# Patient Record
Sex: FEMALE | Employment: OTHER | URBAN - METROPOLITAN AREA
[De-identification: names, ages, dates, MRNs, and addresses within clinical notes are randomized per-mention and may not be internally consistent; named-entity substitution may affect disease eponyms.]

---

## 2018-08-30 ENCOUNTER — HOSPITAL ENCOUNTER (OUTPATIENT)
Dept: PHYSICAL THERAPY | Age: 78
Discharge: HOME | End: 2018-08-30
Payer: MEDICARE

## 2018-08-30 PROCEDURE — 97110 THERAPEUTIC EXERCISES: CPT | Performed by: PHYSICAL THERAPIST

## 2018-08-30 PROCEDURE — G8982 BODY POS GOAL STATUS: HCPCS | Performed by: PHYSICAL THERAPIST

## 2018-08-30 PROCEDURE — 97162 PT EVAL MOD COMPLEX 30 MIN: CPT | Performed by: PHYSICAL THERAPIST

## 2018-08-30 PROCEDURE — G8981 BODY POS CURRENT STATUS: HCPCS | Performed by: PHYSICAL THERAPIST

## 2018-08-30 NOTE — PROGRESS NOTES
PHYSICAL THERAPY - DAILY TREATMENT NOTE Patient Name: Tawanda Self        Date: 2018 : 1940   YES Patient  Verified Visit #:     Insurance: Payor: Ida Cary / Plan: Shalonda Alvarezazeem / Product Type: Medicare / In time: 10:05 Out time: 10:55 Total Treatment Time: 50 Medicare Time Tracking (below) Total Timed Codes (min):  10 1:1 Treatment Time:  10 TREATMENT AREA = Lumbar and sacral arthritis [M48.9] SUBJECTIVE Pain Level (on 0 to 10 scale):  3  / 10 Medication Changes/New allergies or changes in medical history, any new surgeries or procedures? NO    If yes, update Summary List  
Subjective Functional Status/Changes:  []  No changes reported See eval/POC OBJECTIVE Modalities Rationale:     decrease pain and increase tissue extensibility to improve patient's ability to prevent soreness 
 min [] Estim, type/location:    
                                 []  att     []  unatt     []  w/US     []  w/ice    []  w/heat  
 min []  Mechanical Traction: type/lbs   
               []  pro   []  sup   []  int   []  cont    []  before manual    []  after manual  
 min []  Ultrasound, settings/location:    
 min []  Iontophoresis w/ dexamethasone, location:   
                                           []  take home patch       []  in clinic  
10 min []  Ice     [x]  Heat    location/position: Low Back - supine after treatment  
 min []  Vasopneumatic Device, press/temp:   
 min []  Other:   
[] Skin assessment post-treatment (if applicable):   
[]  intact    []  redness- no adverse reaction    
[]redness  adverse reaction:     
 
10 min Therapeutic Exercise:  [x]  See flow sheet Rationale:      increase ROM and improve coordination to improve the patients ability to relieve LBP  
 
 
 min Therapeutic Activity:   
 
 
 min Neuromuscular Re-ed:   
 
 
 min Gait Training:   
 
 min Patient Education:  YES  Reviewed HEP  
 []  Progressed/Changed HEP based on: Other Objective/Functional Measures: FOTO - 40 Post Treatment Pain Level (on 0 to 10) scale:   2  / 10 ASSESSMENT Assessment/Changes in Function:  
 
Signs and symptoms suggest lateral foraminal stenosis symptoms. []  See Progress Note/Recertification Patient will continue to benefit from skilled PT services to modify and progress therapeutic interventions, address functional mobility deficits, address ROM deficits, address strength deficits, analyze and address soft tissue restrictions, analyze and cue movement patterns, analyze and modify body mechanics/ergonomics and assess and modify postural abnormalities to attain remaining goals. Progress toward goals / Updated goals: 
 
Goals established today PLAN [x]  Upgrade activities as tolerated YES Continue plan of care  
[]  Discharge due to :   
[]  Other:   
 
Therapist: Jessica Clement PT Date: 8/30/2018 Time: 9:50 AM  
 
Future Appointments Date Time Provider John Schroeder 8/30/2018 10:00 AM Jessica Clement, PT [facility]

## 2018-08-30 NOTE — PROGRESS NOTES
[address] [facility] [place] [facility]  [facility] 
Lucius Webster \[facility] [place] [facility]\""[address], [address] ,[address], [address]  Phone: [phone]  Fax: [phone] PLAN OF CARE / STATEMENT OF MEDICAL NECESSITY FOR PHYSICAL THERAPY SERVICES Patient Name: Tad Harris : 1940 Medical  
Diagnosis: Lumbar and sacral arthritis [M48.9] Treatment Diagnosis: LBP Onset Date: 2018 Referral Source: Frances Franz MD Start of Care [facility]): 2018 Prior Hospitalization: See medical history Provider #: [id number] Prior Level of Function: Able to walk in community without L LE pain - 6 months ago. Comorbidities: DM, HTN, OA L HIp, kidney disease Medications: Verified on Patient Summary List  
The Plan of Care and following information is based on the information from the initial evaluation.  
=========================================================================================== Assessment / key information:  69 y/o female presents to PT with c/o lower back and left leg pain since about 4 months ago. Her symptoms were 10/10 at one point and she could not tolerate standing or walking. Symptoms are improved with lying down. Currently her symtpoms have stayed around 4/10 with  New medications: gabapentin and tramadol. Today she has pain with walking, but cooley snot have a noticeable limp. Spinal AROM is limited by pain into flexion (25%), extension to 10%, and sidebend left causes pain immediately. L LE strength was limited in hip flexion (3/5 with pain), knee extension (4/5), and DF (3+/5). Pt is tender to palpate centrally at lower lumbar spine. Pt's L Le pain was alleviate with sidelying positional distraction. Pt has signs and symptoms suggestive of left lumbar foraminal stenosis.  
=========================================================================================== Eval Complexity: History MEDIUM  Complexity : 1-2 comorbidities / personal factors will impact the outcome/ POC ;  Examination  MEDIUM Complexity : 3 Standardized tests and measures addressing body structure, function, activity limitation and / or participation in recreation ; Presentation MEDIUM Complexity : Evolving with changing characteristics ; Decision Making MEDIUM Complexity : FOTO score of 26-74; Overall Complexity MEDIUM Problem List: pain affecting function, decrease ROM, decrease strength, impaired gait/ balance, decrease ADL/ functional abilitiies, decrease activity tolerance, decrease flexibility/ joint mobility and decrease transfer abilities Treatment Plan may include any combination of the following: Therapeutic exercise, Therapeutic activities, Neuromuscular re-education, Physical agent/modality, Gait/balance training, Manual therapy, Dry Needling, Aquatic therapy, Patient education, Self Care training, Functional mobility training, Home safety training and Stair training Patient / Family readiness to learn indicated by: asking questions, trying to perform skills and interest 
Persons(s) to be included in education: patient (P) Barriers to Learning/Limitations: None Measures taken:   
Patient Goal (s): \"no more pain - less pain\" Patient self reported health status: good Rehabilitation Potential: good ? Short Term Goals: To be accomplished in  2  weeks: 1. Pt to manage pain to <= 2/10 with HEP, activity modification, and use of heating pad. 2. Pt interdependent with basic HEP for static core stbility, and hip fleixbility exercises. ? Long Term Goals: To be accomplished in  6  weeks: 1. Pt to increase FOTO score form 40 to >/= 56. 
2. Pt independent with high level HEP for activity modification, body mechanics, and therex for hip/spine flexibility and strengthening. 3. Pt able to resume community walking in grocery store etc without having left LE symptoms. Frequency / Duration:   Patient to be seen  2  times per week for 6  weeks: Patient / Caregiver education and instruction: self care, activity modification and exercises G-Codes (GP): [id number] Current  CL= 60-79%  Goal  CK= 40-59%. The severity rating is based on the FOTO Score Therapist Signature: Esthela Vang PT Date: 8/30/2018 Certification Period: 8/30/18 - 11/30/18 Time: 9:53 AM  
=========================================================================================== I certify that the above Physical Therapy Services are being furnished while the patient is under my care. I agree with the treatment plan and certify that this therapy is necessary. Physician Signature:        Date:       Time:    
Please sign and return to [facility] or you may fax the signed copy to [phone]. Thank you.

## 2018-09-05 ENCOUNTER — APPOINTMENT (OUTPATIENT)
Dept: PHYSICAL THERAPY | Age: 78
End: 2018-09-05

## 2018-09-07 ENCOUNTER — APPOINTMENT (OUTPATIENT)
Dept: PHYSICAL THERAPY | Age: 78
End: 2018-09-07

## 2018-09-11 ENCOUNTER — HOSPITAL ENCOUNTER (OUTPATIENT)
Dept: PHYSICAL THERAPY | Age: 78
End: 2018-09-11

## 2018-09-13 ENCOUNTER — APPOINTMENT (OUTPATIENT)
Dept: PHYSICAL THERAPY | Age: 78
End: 2018-09-13

## 2018-09-18 ENCOUNTER — HOSPITAL ENCOUNTER (OUTPATIENT)
Dept: PHYSICAL THERAPY | Age: 78
End: 2018-09-18

## 2018-09-20 ENCOUNTER — APPOINTMENT (OUTPATIENT)
Dept: PHYSICAL THERAPY | Age: 78
End: 2018-09-20

## 2018-09-24 ENCOUNTER — APPOINTMENT (OUTPATIENT)
Dept: PHYSICAL THERAPY | Age: 78
End: 2018-09-24

## 2018-09-27 ENCOUNTER — APPOINTMENT (OUTPATIENT)
Dept: PHYSICAL THERAPY | Age: 78
End: 2018-09-27

## 2018-10-29 NOTE — PROGRESS NOTES
[address] [facility]  [facility], [address] [place] [address], [address]  Phone: [phone]  Fax: [phone] DISCHARGE SUMMARY Patient Name: Rachael Langley : 1940 Treatment/Medical Diagnosis: Low back pain [M54.5] Spondylosis without myelopathy or radiculopathy, lumbar region [M47.816] Referral Source: Elmo Rueda MD    
Date of Initial Visit: 18 Attended Visits: 1 Missed Visits: 1 SUMMARY OF TREATMENT Pt was seen for iniiial evaluation on 18. She called to cancel her appt on 18 due to not feeling well. She did not schedule any further PT, and her current status is unknown. Will DC from PT. RECOMMENDATIONS Discontinue therapy due to lack of attendance or compliance. If you have any questions/comments please contact us directly at [phone]. Thank you for allowing us to assist in the care of your patient. Therapist Signature: Stella Talley PT Date: 10/29/18   Time: 11:15 AM